# Patient Record
Sex: MALE | Race: WHITE
[De-identification: names, ages, dates, MRNs, and addresses within clinical notes are randomized per-mention and may not be internally consistent; named-entity substitution may affect disease eponyms.]

---

## 2020-12-29 ENCOUNTER — HOSPITAL ENCOUNTER (EMERGENCY)
Dept: HOSPITAL 94 - ER | Age: 70
Discharge: HOME | End: 2020-12-29
Payer: COMMERCIAL

## 2020-12-29 VITALS — SYSTOLIC BLOOD PRESSURE: 140 MMHG | DIASTOLIC BLOOD PRESSURE: 78 MMHG

## 2020-12-29 VITALS — BODY MASS INDEX: 23.15 KG/M2 | WEIGHT: 165.35 LBS | HEIGHT: 71 IN
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
## 2020-12-29 DIAGNOSIS — Z72.89: ICD-10-CM
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
## 2020-12-29 DIAGNOSIS — Z79.899: ICD-10-CM

## 2020-12-29 DIAGNOSIS — S61.217A: Primary | ICD-10-CM
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
## 2020-12-29 PROCEDURE — 73140 X-RAY EXAM OF FINGER(S): CPT

## 2020-12-29 PROCEDURE — 99283 EMERGENCY DEPT VISIT LOW MDM: CPT

## 2020-12-29 PROCEDURE — 12002 RPR S/N/AX/GEN/TRNK2.6-7.5CM: CPT

## 2020-12-29 PROCEDURE — 90471 IMMUNIZATION ADMIN: CPT

## 2020-12-29 PROCEDURE — 90715 TDAP VACCINE 7 YRS/> IM: CPT

## 2020-12-29 NOTE — NUR
Patient did not received first dose keflex prior to discharge. Patient ready to 
leave will pick of prescription for keflex and start abx today.